# Patient Record
Sex: MALE | Race: BLACK OR AFRICAN AMERICAN
[De-identification: names, ages, dates, MRNs, and addresses within clinical notes are randomized per-mention and may not be internally consistent; named-entity substitution may affect disease eponyms.]

---

## 2018-07-04 ENCOUNTER — HOSPITAL ENCOUNTER (INPATIENT)
Dept: HOSPITAL 74 - YASAS | Age: 58
LOS: 4 days | Discharge: HOME | End: 2018-07-08
Attending: SURGERY | Admitting: SURGERY
Payer: COMMERCIAL

## 2018-07-04 VITALS — BODY MASS INDEX: 19.8 KG/M2

## 2018-07-04 DIAGNOSIS — H54.413A: ICD-10-CM

## 2018-07-04 DIAGNOSIS — Z88.6: ICD-10-CM

## 2018-07-04 DIAGNOSIS — J45.909: ICD-10-CM

## 2018-07-04 DIAGNOSIS — M54.5: ICD-10-CM

## 2018-07-04 DIAGNOSIS — F10.230: Primary | ICD-10-CM

## 2018-07-04 DIAGNOSIS — R63.4: ICD-10-CM

## 2018-07-04 DIAGNOSIS — F14.20: ICD-10-CM

## 2018-07-04 DIAGNOSIS — Z59.0: ICD-10-CM

## 2018-07-04 DIAGNOSIS — Z91.018: ICD-10-CM

## 2018-07-04 DIAGNOSIS — G89.29: ICD-10-CM

## 2018-07-04 DIAGNOSIS — F17.210: ICD-10-CM

## 2018-07-04 DIAGNOSIS — F50.9: ICD-10-CM

## 2018-07-04 DIAGNOSIS — R76.11: ICD-10-CM

## 2018-07-04 PROCEDURE — HZ2ZZZZ DETOXIFICATION SERVICES FOR SUBSTANCE ABUSE TREATMENT: ICD-10-PCS | Performed by: SURGERY

## 2018-07-04 SDOH — ECONOMIC STABILITY - HOUSING INSECURITY: HOMELESSNESS: Z59.0

## 2018-07-04 NOTE — HP
CIWA Score





- CIWA Score


Nausea/Vomiting: 3


Muscle Tremors: 3


Anxiety: 3


Agitation: 3


Paroxysmal Sweats: 1-Minimal Palms Moist


Orientation: 0-Oriented


Tacttile Disturbances: 1-Very Mild Itch/Numbness


Auditory Disturbances: 1-Very Mild


Visual Disturbances: 0-None


Headache: 2-Mild


CIWA-Ar Total Score: 17





Admission ROS [facility]





- HPI


Chief Complaint: 





i need help to stop drinking alcohol and cocaine


Allergies/Adverse Reactions: 


 Allergies











Allergy/AdvReac Type Severity Reaction Status Date / Time


 


aspirin Allergy Severe Swelling Verified 16 18:26


 


tomato [Tomato] Allergy Mild  Verified 16 18:26


 


milk AdvReac Intermediate Nausea Verified 16 18:26


 


lactose AdvReac  Nausea Verified 16 18:26











History of Present Illness: 





this 58 years old male with alcohol and cocaine dependence,seeking detox,

withdrawal symptom,last detox





- Ebola screening


Have you traveled outside of the country in the last 21 days: No (N)


Have you had contact with anyone from an Ebola affected area: No


Have you been sick,other than usual withdrawal symptoms: No


Do you have a fever: No





- Review of Systems


Constitutional: Loss of Appetite, Malaise, Night Sweats, Changes in sleep, 

Unintentional Wgt. Loss


EENT: reports: Nose Congestion, Other (injury to right eye 2017,hit by object,

had surgery ,but loss vison since then treated at [facility])


Respiratory: reports: No Symptoms reported


Cardiac: reports: No Symptoms Reported


GI: reports: Diarrhea, Poor Appetite, Vomiting


: reports: No Symptoms Reported


Musculoskeletal: reports: Muscle Pain


Integumentary: reports: Dryness


Neuro: reports: Tremors


Hematology: reports: No Symptoms Reported


Psychiatric: reports: No Sypmtoms Reported, Judgement Intact, Mood/Affect 

Appropiate, Orientated x3





Patient History





- Patient Medical History


Hx Anemia: Yes (not sure if still anemic; not taking any supplement)


Hx Asthma: Yes


Hx Chronic Obstructive Pulmonary Disease (COPD): No


Hx Cancer: No


Hx Cardiac Disorders: No


Hx Congestive Heart Failure: No


Hx Hypertension: No


Hx Hypercholesterolemia: No


Hx Pacemaker: No


HX Cerebrovascular Accident: No


Hx Seizures: No


Hx Dementia: No


Hx Diabetes: No


Hx Gastrointestinal Disorders: No


Hx Liver Disease: No


Hx Genitourinary Disorders: No


Hx Sexually Transmitted Disorders: No


Hx Renal Disease (ESRD): No


Hx Thyroid Disease: No


Hx Human Immunodeficiency Virus (HIV): No ( negative)


Hx Hepatitis C: No


Hx Depression: No


Hx Suicide Attempt: No


Hx Bipolar Disorder: No


Hx Schizophrenia: No


Other Medical History: no suicidal,no homicidal





- Patient Surgical History


Past Surgical History: Yes


Hx Neurologic Surgery: No


Hx Cataract Extraction: No


Hx Cardiac Surgery: No


Hx Lung Surgery: No


Hx Breast Surgery: No


Hx Breast Biopsy: No


Hx Abdominal Surgery: No


Hx Appendectomy: Yes (at age of 9 years)


Hx Cholecystectomy: No


Hx Genitourinary Surgery: No


Hx  Section: No


Hx Orthopedic Surgery: No


Other Surgical History: Testicle removed @ 6 yrs old.


Anesthesia Reaction: No





- PPD History


Previous Implant?: Yes


Documented Results: Positive w/o proof


Date: 13


Results: POSITIVE


PPD to be Administered?: No





- Smoking Cessation


Smoking history: Current every day smoker


Have you smoked in the past 12 months: Yes


Aproximately how many cigarettes per day: 10


Cigars Per Day: 0


Hx Chewing Tobacco Use: No


Initiated information on smoking cessation: Yes


'Breaking Loose' booklet given: 18





- Substance & Tx. History


Hx Alcohol Use: Yes


Hx Substance Use: Yes


Substance Use Type: Alcohol, Cocaine


Hx Substance Use Treatment: Yes ([facility] 16 to 16)





- Substances Abused


  ** Alcohol


Route: Oral


Frequency: Daily


Amount used: 1/5th vodka/12 of 24 ozs of beer


Age of first use: 8


Date of Last Use: 18





  ** Cocaine


Route: Inhalation


Frequency: 3-6 times per week


Amount used: 200$


Age of first use: 17


Date of Last Use: 18





Family Disease History





- Family Disease History


Family Disease History: CA: Grandparent (MAT GM-BREAST-), Other: Father 

(ALCOHOLIC-), Brother (ALCOHOLIC)





Admission Physical Exam [facility]





- Vital Signs


Vital Signs: 


 Vital Signs - 24 hr











  18





  23:21


 


Temperature 97.7 F


 


Pulse Rate 72


 


Respiratory 19





Rate 


 


Blood Pressure 134/94














- Physical


General Appearance: Yes: Moderate Distress, Irritable, Sweating, Anxious


HEENTM: Yes: Nasal Congestion (blindness of right eye post trauma had surgery)


Respiratory: Yes: Lungs Clear, Normal Breath Sounds, No Respiratory Distress


Neck: Yes: Within Normal Limits


Breast: Yes: Within Normal Limits


Cardiology: Yes: Within Normal Limits, Regular Rhythm, Regular Rate, S1, S2


Abdominal: Yes: Within Normal Limits, Normal Bowel Sounds, Non Tender, Flat, 

Soft


Genitourinary: Yes: Within Normal Limits


Back: Yes: Within Normal Limits, Muscle Spasm


Musculoskeletal: Yes: full range of Motion, Back pain, Muscle Pain


Extremities: Yes: Tremors


Neurological: Yes: CNs II-XII NML intact, Fully Oriented, Alert, Motor Strength 

5/5


Integumentary: Yes: Dry


Lymphatic: Yes: Within Normal Limits





- Diagnostic


(1) Alcohol dependence with uncomplicated withdrawal


Current Visit: No   Status: Acute   





(2) Cocaine dependence


Current Visit: No   Status: Acute   





(3) Asthma


Current Visit: No   Status: Chronic   





(4) Chronic LBP


Current Visit: No   Status: Chronic   





(5) Nicotine dependence


Current Visit: No   Status: Chronic   





(6) History of anemia


Current Visit: No   Status: Suspected   





(7) Blindness of right eye


Current Visit: Yes   Status: Acute   





(8) Weight loss


Current Visit: Yes   Status: Acute   





(9) Positive PPD


Current Visit: Yes   Status: Acute   





Cleared for Admission [facility]





- Detox or Rehab


[facility] Level of Care: Medically Managed


Detox Regimen/Protocol: Librium





BHS Breath Alcohol Content


Breath Alcohol Content: 0.025





Urine Drug Screen





- Results


Drug Screen Negative: No


Urine Drug Screen Results: KIMBERLY-Cocaine, BZO-Benzodiazepines

## 2018-07-05 LAB
ALBUMIN SERPL-MCNC: 4 G/DL (ref 3.4–5)
ALP SERPL-CCNC: 56 U/L (ref 45–117)
ALT SERPL-CCNC: 26 U/L (ref 12–78)
ANION GAP SERPL CALC-SCNC: 8 MMOL/L (ref 8–16)
AST SERPL-CCNC: 26 U/L (ref 15–37)
BILIRUB SERPL-MCNC: 1.2 MG/DL (ref 0.2–1)
BUN SERPL-MCNC: 25 MG/DL (ref 7–18)
CALCIUM SERPL-MCNC: 8.7 MG/DL (ref 8.5–10.1)
CHLORIDE SERPL-SCNC: 100 MMOL/L (ref 98–107)
CO2 SERPL-SCNC: 31 MMOL/L (ref 21–32)
CREAT SERPL-MCNC: 1.5 MG/DL (ref 0.7–1.3)
DEPRECATED RDW RBC AUTO: 23 % (ref 11.9–15.9)
GLUCOSE SERPL-MCNC: 110 MG/DL (ref 74–106)
HCT VFR BLD CALC: 30.2 % (ref 35.4–49)
HGB BLD-MCNC: 10 GM/DL (ref 11.7–16.9)
MCH RBC QN AUTO: 27.6 PG (ref 25.7–33.7)
MCHC RBC AUTO-ENTMCNC: 33 G/DL (ref 32–35.9)
MCV RBC: 83.6 FL (ref 80–96)
PLATELET # BLD AUTO: 220 K/MM3 (ref 134–434)
PMV BLD: 8.9 FL (ref 7.5–11.1)
POTASSIUM SERPLBLD-SCNC: 4.2 MMOL/L (ref 3.5–5.1)
PROT SERPL-MCNC: 7.8 G/DL (ref 6.4–8.2)
RBC # BLD AUTO: 3.61 M/MM3 (ref 4–5.6)
SODIUM SERPL-SCNC: 139 MMOL/L (ref 136–145)
WBC # BLD AUTO: 4.3 K/MM3 (ref 4–10)

## 2018-07-05 RX ADMIN — Medication PRN MG: at 01:37

## 2018-07-05 RX ADMIN — BRIMONIDINE TARTRATE SCH DROP: 2 SOLUTION/ DROPS OPHTHALMIC at 10:54

## 2018-07-05 RX ADMIN — POLYVINYL ALCOHOL SCH: 14 SOLUTION/ DROPS OPHTHALMIC at 07:57

## 2018-07-05 RX ADMIN — Medication SCH TAB: at 10:52

## 2018-07-05 RX ADMIN — Medication PRN MG: at 22:39

## 2018-07-05 RX ADMIN — TIMOLOL MALEATE SCH DRP: 5 SOLUTION OPHTHALMIC at 10:54

## 2018-07-05 RX ADMIN — POLYVINYL ALCOHOL SCH DROP: 14 SOLUTION/ DROPS OPHTHALMIC at 22:40

## 2018-07-05 RX ADMIN — Medication SCH MG: at 22:38

## 2018-07-05 RX ADMIN — POLYVINYL ALCOHOL SCH: 14 SOLUTION/ DROPS OPHTHALMIC at 14:43

## 2018-07-05 RX ADMIN — CYCLOBENZAPRINE HYDROCHLORIDE SCH MG: 10 TABLET, FILM COATED ORAL at 22:38

## 2018-07-05 RX ADMIN — BRIMONIDINE TARTRATE SCH DROP: 2 SOLUTION/ DROPS OPHTHALMIC at 22:41

## 2018-07-05 RX ADMIN — TIMOLOL MALEATE SCH DROP: 5 SOLUTION OPHTHALMIC at 22:41

## 2018-07-05 NOTE — PN
BHS Progress Note


Note: 





pt is complaining of whole body pain- will give flexeril,  pt is on tylenol and 

motrin

## 2018-07-05 NOTE — EKG
Test Reason : 

Blood Pressure : ***/*** mmHG

Vent. Rate : 064 BPM     Atrial Rate : 064 BPM

   P-R Int : 158 ms          QRS Dur : 092 ms

    QT Int : 414 ms       P-R-T Axes : 072 038 049 degrees

   QTc Int : 427 ms

 

NORMAL SINUS RHYTHM

MODERATE VOLTAGE CRITERIA FOR LVH, MAY BE NORMAL VARIANT

BORDERLINE ECG

WHEN COMPARED WITH ECG OF 23-JUL-2015 00:59,

NO SIGNIFICANT CHANGE WAS FOUND

Confirmed by BREN RUDD, TIM ([id number]) on 7/5/2018 11:30:47 AM

 

Referred By:             Confirmed By:TIM CORREIA MD

## 2018-07-05 NOTE — PN
[facility] CIWA





- CIWA Score


Nausea/Vomitin-No Nausea/No Vomiting


Muscle Tremors: 4-Moderate,w/Arms Extend


Anxiety: 4-Mod. Anxious/Guarded


Agitation: 4-Moderately Restless


Paroxysmal Sweats: 1-Minimal Palms Moist


Orientation: 0-Oriented


Tacttile Disturbances: 0-None


Auditory Disturbances: 0-None


Visual Disturbances: 0-None


Headache: 0-None Present


CIWA-Ar Total Score: 13





BHS Progress Note (SOAP)


Subjective: 





ANXIETY,FATIGUE,INTERMITTENT SLEEP.


Objective: 





18 14:36


 Vital Signs











  18





  09:19 13:40


 


Temperature 97.0 F L 97.3 F L


 


Pulse Rate 55 L 61


 


Respiratory 18 20





Rate  


 


Blood Pressure 117/64 110/61








 Laboratory Tests











  18





  07:40 07:40 07:40


 


WBC  4.3  


 


RBC  3.61 L  


 


Hgb  10.0 L  


 


Hct  30.2 L  


 


MCV  83.6  


 


MCH  27.6  


 


MCHC  33.0  


 


RDW  23.0 H  


 


Plt Count  220  


 


MPV  8.9  


 


Sodium   139 


 


Potassium   4.2 


 


Chloride   100 


 


Carbon Dioxide   31 


 


Anion Gap   8 


 


BUN   25 H 


 


Creatinine   1.5 H 


 


Creat Clearance w eGFR   48.07 


 


Random Glucose   110 H D 


 


Calcium   8.7 


 


Total Bilirubin   1.2 H 


 


AST   26  D 


 


ALT   26 


 


Alkaline Phosphatase   56 


 


Total Protein   7.8 


 


Albumin   4.0 


 


RPR Titer    Nonreactive











Assessment: 





18 14:37


WITHDRAWAL SX


Plan: 





CONTINUE DETOX

## 2018-07-06 RX ADMIN — Medication SCH MG: at 22:10

## 2018-07-06 RX ADMIN — CYCLOBENZAPRINE HYDROCHLORIDE SCH: 10 TABLET, FILM COATED ORAL at 06:16

## 2018-07-06 RX ADMIN — FERROUS SULFATE TAB EC 324 MG (65 MG FE EQUIVALENT) SCH MG: 324 (65 FE) TABLET DELAYED RESPONSE at 14:20

## 2018-07-06 RX ADMIN — TIMOLOL MALEATE SCH DROP: 5 SOLUTION OPHTHALMIC at 10:20

## 2018-07-06 RX ADMIN — BRIMONIDINE TARTRATE SCH DROP: 2 SOLUTION/ DROPS OPHTHALMIC at 22:11

## 2018-07-06 RX ADMIN — POLYVINYL ALCOHOL SCH DROP: 14 SOLUTION/ DROPS OPHTHALMIC at 22:18

## 2018-07-06 RX ADMIN — POLYVINYL ALCOHOL SCH: 14 SOLUTION/ DROPS OPHTHALMIC at 06:16

## 2018-07-06 RX ADMIN — POLYVINYL ALCOHOL SCH DROP: 14 SOLUTION/ DROPS OPHTHALMIC at 14:18

## 2018-07-06 RX ADMIN — CYCLOBENZAPRINE HYDROCHLORIDE SCH MG: 10 TABLET, FILM COATED ORAL at 22:10

## 2018-07-06 RX ADMIN — TIMOLOL MALEATE SCH DROP: 5 SOLUTION OPHTHALMIC at 22:12

## 2018-07-06 RX ADMIN — BRIMONIDINE TARTRATE SCH DROP: 2 SOLUTION/ DROPS OPHTHALMIC at 10:21

## 2018-07-06 RX ADMIN — Medication PRN MG: at 22:14

## 2018-07-06 RX ADMIN — CYCLOBENZAPRINE HYDROCHLORIDE SCH MG: 10 TABLET, FILM COATED ORAL at 14:18

## 2018-07-06 RX ADMIN — Medication SCH TAB: at 10:21

## 2018-07-06 NOTE — PN
S CIWA





- CIWA Score


Nausea/Vomitin-No Nausea/No Vomiting


Muscle Tremors: 4-Moderate,w/Arms Extend


Anxiety: 4-Mod. Anxious/Guarded


Agitation: 4-Moderately Restless


Paroxysmal Sweats: 1-Minimal Palms Moist


Orientation: 0-Oriented


Tacttile Disturbances: 0-None


Auditory Disturbances: 0-None


Visual Disturbances: 0-None


Headache: 0-None Present


CIWA-Ar Total Score: 13





BHS Progress Note (SOAP)


Subjective: 





ANXIETY,INTERMITTENT SLEEP,FATIGUE.


Objective: 





18 12:36


 Vital Signs











  18





  06:43


 


Temperature 97.2 F L


 


Pulse Rate 51 L


 


Respiratory 18





Rate 


 


Blood Pressure 108/65








 Laboratory Tests











  18





  07:40 07:40 07:40


 


WBC  4.3  


 


RBC  3.61 L  


 


Hgb  10.0 L  


 


Hct  30.2 L  


 


MCV  83.6  


 


MCH  27.6  


 


MCHC  33.0  


 


RDW  23.0 H  


 


Plt Count  220  


 


MPV  8.9  


 


Sodium   139 


 


Potassium   4.2 


 


Chloride   100 


 


Carbon Dioxide   31 


 


Anion Gap   8 


 


BUN   25 H 


 


Creatinine   1.5 H 


 


Creat Clearance w eGFR   48.07 


 


Random Glucose   110 H D 


 


Calcium   8.7 


 


Total Bilirubin   1.2 H 


 


AST   26  D 


 


ALT   26 


 


Alkaline Phosphatase   56 


 


Total Protein   7.8 


 


Albumin   4.0 


 


RPR Titer    Nonreactive











Assessment: 





18 12:36


WITHDRAWAL SX


Plan: 





CONTINUE DETOX

## 2018-07-07 RX ADMIN — CYCLOBENZAPRINE HYDROCHLORIDE SCH MG: 10 TABLET, FILM COATED ORAL at 22:27

## 2018-07-07 RX ADMIN — POLYVINYL ALCOHOL SCH: 14 SOLUTION/ DROPS OPHTHALMIC at 22:31

## 2018-07-07 RX ADMIN — BRIMONIDINE TARTRATE SCH DROP: 2 SOLUTION/ DROPS OPHTHALMIC at 10:26

## 2018-07-07 RX ADMIN — Medication SCH TAB: at 10:26

## 2018-07-07 RX ADMIN — BRIMONIDINE TARTRATE SCH: 2 SOLUTION/ DROPS OPHTHALMIC at 22:30

## 2018-07-07 RX ADMIN — Medication PRN MG: at 22:28

## 2018-07-07 RX ADMIN — POLYVINYL ALCOHOL SCH: 14 SOLUTION/ DROPS OPHTHALMIC at 06:47

## 2018-07-07 RX ADMIN — CYCLOBENZAPRINE HYDROCHLORIDE SCH: 10 TABLET, FILM COATED ORAL at 06:45

## 2018-07-07 RX ADMIN — TIMOLOL MALEATE SCH DROP: 5 SOLUTION OPHTHALMIC at 10:26

## 2018-07-07 RX ADMIN — CYCLOBENZAPRINE HYDROCHLORIDE SCH MG: 10 TABLET, FILM COATED ORAL at 14:34

## 2018-07-07 RX ADMIN — Medication SCH MG: at 22:27

## 2018-07-07 RX ADMIN — FERROUS SULFATE TAB EC 324 MG (65 MG FE EQUIVALENT) SCH MG: 324 (65 FE) TABLET DELAYED RESPONSE at 10:26

## 2018-07-07 RX ADMIN — POLYVINYL ALCOHOL SCH DROP: 14 SOLUTION/ DROPS OPHTHALMIC at 14:34

## 2018-07-07 RX ADMIN — TIMOLOL MALEATE SCH: 5 SOLUTION OPHTHALMIC at 22:30

## 2018-07-07 NOTE — PN
BHS Progress Note (SOAP)


Subjective: 





Anxious, Fatigue.


Objective: 


PATIENT A & O X 3, OBSERVED AMBULATING ON UNIT. NO ACUTE DISTRESS.





07/07/18 16:40


 Vital Signs











Temperature  97.4 F L  07/07/18 15:16


 


Pulse Rate  66   07/07/18 15:16


 


Respiratory Rate  18   07/07/18 15:16


 


Blood Pressure  119/70   07/07/18 15:16


 


O2 Sat by Pulse Oximetry (%)      








 Laboratory Tests











  07/05/18 07/05/18 07/05/18





  07:40 07:40 07:40


 


WBC  4.3  


 


RBC  3.61 L  


 


Hgb  10.0 L  


 


Hct  30.2 L  


 


MCV  83.6  


 


MCH  27.6  


 


MCHC  33.0  


 


RDW  23.0 H  


 


Plt Count  220  


 


MPV  8.9  


 


Sodium   139 


 


Potassium   4.2 


 


Chloride   100 


 


Carbon Dioxide   31 


 


Anion Gap   8 


 


BUN   25 H 


 


Creatinine   1.5 H 


 


Creat Clearance w eGFR   48.07 


 


Random Glucose   110 H D 


 


Calcium   8.7 


 


Total Bilirubin   1.2 H 


 


AST   26  D 


 


ALT   26 


 


Alkaline Phosphatase   56 


 


Total Protein   7.8 


 


Albumin   4.0 


 


RPR Titer    Nonreactive








LABS NOTED.


UA RESULTS PENDING.


07/07/18 16:41





Assessment: 





07/07/18 16:41


WITHDRAWAL SYMPTOMS.


Plan: 





CONTINUE DETOX.





PATIENT SCHEDULED FOR DISCHARGE TOMORROW.

## 2018-07-08 VITALS — DIASTOLIC BLOOD PRESSURE: 68 MMHG | TEMPERATURE: 96.8 F | SYSTOLIC BLOOD PRESSURE: 119 MMHG | HEART RATE: 50 BPM

## 2018-07-08 RX ADMIN — BRIMONIDINE TARTRATE SCH DROP: 2 SOLUTION/ DROPS OPHTHALMIC at 09:04

## 2018-07-08 RX ADMIN — Medication SCH TAB: at 09:03

## 2018-07-08 RX ADMIN — FERROUS SULFATE TAB EC 324 MG (65 MG FE EQUIVALENT) SCH MG: 324 (65 FE) TABLET DELAYED RESPONSE at 09:03

## 2018-07-08 RX ADMIN — POLYVINYL ALCOHOL SCH: 14 SOLUTION/ DROPS OPHTHALMIC at 06:09

## 2018-07-08 RX ADMIN — CYCLOBENZAPRINE HYDROCHLORIDE SCH: 10 TABLET, FILM COATED ORAL at 06:09

## 2018-07-08 RX ADMIN — TIMOLOL MALEATE SCH DROP: 5 SOLUTION OPHTHALMIC at 09:04

## 2018-07-08 NOTE — PN
BHS Progress Note (SOAP)


Subjective: 





pt without complaints today- gong home


Objective: 





07/08/18 11:36


 Vital Signs - 24 hr











  07/07/18 07/07/18 07/07/18





  15:16 18:31 23:53


 


Temperature 97.4 F L 97.4 F L 97 F L


 


Pulse Rate 66 57 L 69


 


Respiratory 18 18 18





Rate   


 


Blood Pressure 119/70 97/56 116/77














  07/08/18 07/08/18 07/08/18





  00:30 03:30 06:24


 


Temperature   96.8 F L


 


Pulse Rate   50 L


 


Respiratory 18 18 18





Rate   


 


Blood Pressure   119/68








 CBC, BMP





 07/05/18 07:40 





 07/05/18 07:40 





stable VSS


ambulatory


Assessment: 





07/08/18 11:37


alcohol detox completed


anemia- f/u PCP


Plan: 





f/u PCP 


d/c to home today

## 2018-07-08 NOTE — DS
BHS Detox Discharge Summary


Admission Date: 


07/04/18





Discharge Date: 07/08/18





- History


Present History: Alcohol Dependence, Cocaine Dependence





- Physical Exam Results


Vital Signs: 


 Vital Signs











Temperature  96.8 F L  07/08/18 06:24


 


Pulse Rate  50 L  07/08/18 06:24


 


Respiratory Rate  18   07/08/18 06:24


 


Blood Pressure  119/68   07/08/18 06:24


 


O2 Sat by Pulse Oximetry (%)      











Pertinent Admission Physical Exam Findings: 





pt awake and oriented


grossly nl PE





- Treatment


Hospital Course: Detox Protocol Followed, Detoxed Safely, Responded well, 

Discharged Condition Good





- Medication


Discharge Medications: 


Ambulatory Orders





Albuterol Sulfate Inhaler - [Ventolin HFA Inhaler -] 2 inh PO Q4H PRN 02/09/16 


Alphagan 0.2% - 1 drop OS BID 07/05/18 


Polyvinyl Alcohol [Artificial Tears] 1 drop OU TID 07/05/18 


Timolol 0.5% [Timoptic 0.5%] 1 drop OS BID 07/05/18 











- Diagnosis


(1) Alcohol dependence with uncomplicated withdrawal


Status: Acute   





(2) Cocaine dependence


Status: Acute   





(3) Blindness of right eye


Status: Chronic   





(4) Iron deficiency anemia


Status: Suspected   


Qualifiers: 


   Iron deficiency anemia type: unspecified iron deficiency   Qualified Code(s)

: D50.9 - Iron deficiency anemia, unspecified   





- AMA


Did Patient Leave Against Medical Advice: No

## 2018-09-29 ENCOUNTER — HOSPITAL ENCOUNTER (INPATIENT)
Dept: HOSPITAL 74 - YASAS | Age: 58
LOS: 2 days | Discharge: LEFT BEFORE DISCHARGE | DRG: 770 | End: 2018-10-01
Attending: INTERNAL MEDICINE | Admitting: INTERNAL MEDICINE
Payer: COMMERCIAL

## 2018-09-29 VITALS — BODY MASS INDEX: 20.9 KG/M2

## 2018-09-29 DIAGNOSIS — J45.909: ICD-10-CM

## 2018-09-29 DIAGNOSIS — Z87.438: ICD-10-CM

## 2018-09-29 DIAGNOSIS — K21.9: ICD-10-CM

## 2018-09-29 DIAGNOSIS — F17.210: ICD-10-CM

## 2018-09-29 DIAGNOSIS — R76.11: ICD-10-CM

## 2018-09-29 DIAGNOSIS — Z88.6: ICD-10-CM

## 2018-09-29 DIAGNOSIS — M54.5: ICD-10-CM

## 2018-09-29 DIAGNOSIS — I12.9: ICD-10-CM

## 2018-09-29 DIAGNOSIS — F14.20: ICD-10-CM

## 2018-09-29 DIAGNOSIS — Z59.0: ICD-10-CM

## 2018-09-29 DIAGNOSIS — Z91.011: ICD-10-CM

## 2018-09-29 DIAGNOSIS — F10.230: Primary | ICD-10-CM

## 2018-09-29 DIAGNOSIS — D50.9: ICD-10-CM

## 2018-09-29 DIAGNOSIS — G89.29: ICD-10-CM

## 2018-09-29 DIAGNOSIS — N18.3: ICD-10-CM

## 2018-09-29 DIAGNOSIS — H54.40: ICD-10-CM

## 2018-09-29 PROCEDURE — HZ2ZZZZ DETOXIFICATION SERVICES FOR SUBSTANCE ABUSE TREATMENT: ICD-10-PCS | Performed by: INTERNAL MEDICINE

## 2018-09-29 RX ADMIN — Medication SCH MG: at 23:02

## 2018-09-29 RX ADMIN — AMLODIPINE BESYLATE SCH MG: 5 TABLET ORAL at 10:50

## 2018-09-29 RX ADMIN — Medication SCH: at 12:07

## 2018-09-29 RX ADMIN — Medication SCH: at 23:07

## 2018-09-29 RX ADMIN — Medication SCH: at 14:41

## 2018-09-29 RX ADMIN — TIMOLOL MALEATE SCH: 5 SOLUTION OPHTHALMIC at 12:07

## 2018-09-29 RX ADMIN — TIMOLOL MALEATE SCH DROP: 5 SOLUTION OPHTHALMIC at 22:39

## 2018-09-29 RX ADMIN — Medication PRN MG: at 22:42

## 2018-09-29 RX ADMIN — Medication SCH: at 17:22

## 2018-09-29 SDOH — ECONOMIC STABILITY - HOUSING INSECURITY: HOMELESSNESS: Z59.0

## 2018-09-29 NOTE — HP
CIWA Score





- CIWA Score


Nausea/Vomitin-Mild Nausea/No Vomiting


Muscle Tremors: 2


Anxiety: 3


Agitation: 1-Slight > Activity


Paroxysmal Sweats: No Perspiration


Orientation: 1-Uncertain about Date


Tacttile Disturbances: 1-Very Mild Itch/Numbness


Auditory Disturbances: 0-None


Visual Disturbances: 1-Very Mild Sensitivity


Headache: 2-Mild


CIWA-Ar Total Score: 12





Admission ROS [facility]





- HPI


Chief Complaint: 


I'm too old, I can't do it anymore, I'm an alcoholic, I need help


Allergies/Adverse Reactions: 


 Allergies











Allergy/AdvReac Type Severity Reaction Status Date / Time


 


aspirin Allergy Severe Swelling Verified 18 09:20


 


tomato [Tomato] Allergy Mild  Verified 18 09:20


 


milk AdvReac Intermediate Nausea Verified 18 09:20


 


lactose AdvReac  Nausea Verified 18 09:20











History of Present Illness: 


57 yo gentleman here for detox from alcohol, also using cocaine.  Denies 

seizures but does have black outs.  Patient reports he gets very sick when he 

tries to stop drinking - feels like 'something sticks into him all over' , 

states he gets very shaky and gets diarrhea and gets very nervous when he tries 

to stop drinking on his own.


Exam Limitations: Clinical Condition





- Ebola screening


Have you traveled outside of the country in the last 21 days: No (N)


Have you had contact with anyone from an Ebola affected area: No


Have you been sick,other than usual withdrawal symptoms: No


Do you have a fever: No





- Review of Systems


Constitutional: Loss of Appetite, Malaise, Changes in sleep, Weakness


EENT: reports: Blurred Vision


Respiratory: reports: No Symptoms reported


Cardiac: reports: No Symptoms Reported


GI: reports: Diarrhea, Nausea, Poor Appetite, Indigestion, Abdominal cramping


: reports: Frequency


Musculoskeletal: reports: Back Pain, Muscle Pain


Integumentary: reports: Dryness


Neuro: reports: Headache


Endocrine: reports: No Symptoms Reported


Hematology: reports: Anemia


Psychiatric: reports: Judgement Intact, Mood/Affect Appropiate, Anxious


Other Systems: Reviewed and Negative





Patient History





- Patient Medical History


Hx Anemia: Yes (not sure if still anemic; not taking any supplement)


Hx Asthma: Yes


Hx Chronic Obstructive Pulmonary Disease (COPD): No


Hx Cancer: No


Hx Cardiac Disorders: No


Hx Congestive Heart Failure: No


Hx Hypertension: Yes (on meds)


Hx Hypercholesterolemia: No


Hx Pacemaker: No


HX Cerebrovascular Accident: No


Hx Seizures: No


Hx Dementia: No


Hx Diabetes: No


Hx Gastrointestinal Disorders: Yes (gerd)


Hx Liver Disease: No


Hx Genitourinary Disorders: No


Hx Sexually Transmitted Disorders: Yes (hx syphilis years ago got injections)


Hx Renal Disease (ESRD): Yes (renal insufficiency)


Hx Thyroid Disease: No


Hx Human Immunodeficiency Virus (HIV): No ( negative)


Hx Hepatitis C: No


Hx Depression: No (denied)


Hx Suicide Attempt: No


Hx Bipolar Disorder: No


Hx Schizophrenia: No





- Patient Surgical History


Past Surgical History: Yes


Hx Neurologic Surgery: No


Hx Cataract Extraction: No


Hx Cardiac Surgery: No


Hx Lung Surgery: No


Hx Breast Surgery: No


Hx Breast Biopsy: No


Hx Abdominal Surgery: No


Hx Appendectomy: Yes (at age of 9 years)


Hx Cholecystectomy: No


Hx Genitourinary Surgery: No


Hx  Section: No


Hx Orthopedic Surgery: No


Other Surgical History: Testicle removed @ 6 yrs old.;right eye surgery 2017 to 

remove lens-trauma


Anesthesia Reaction: No





- PPD History


Previous Implant?: Yes


Documented Results: Positive w/proof


Implanted On Prior [facility] Admission?: No


Date: 13 (treated)


Results: cxr 18 done


PPD to be Administered?: No





- Reproductive History


Patient is a Female of Child Bearing Age (11 -55 yrs old): No





- Smoking Cessation


Smoking history: Current every day smoker


Have you smoked in the past 12 months: Yes


Aproximately how many cigarettes per day: 10


Cigars Per Day: 0


Hx Chewing Tobacco Use: No


Initiated information on smoking cessation: Yes


'Breaking Loose' booklet given: 18 (give on floor)





- Substance & Tx. History


Hx Alcohol Use: Yes


Hx Substance Use: Yes


Substance Use Type: Alcohol, Cocaine


Hx Substance Use Treatment: Yes





- Substances Abused


  ** alcohol


Route: Oral


Frequency: Daily


Amount used: 1/5 vodka; 24 oz beer


Age of first use: 8


Date of Last Use: 18





  ** cocaine


Route: Inhalation


Frequency: Daily


Amount used: $200


Age of first use: 17


Date of Last Use: 18





Family Disease History





- Family Disease History


Family Disease History: CA: Grandparent (MAT GM-BREAST-), Other: Father 

( - etoh), Mother (living, ), Brother (two - one who drinks), Sister (

one - living - healthy), Son (two - living)





Admission Physical Exam [facility]





- Vital Signs


Vital Signs: 


 Vital Signs - 24 hr











  18





  08:41


 


Temperature 97.6 F


 


Pulse Rate 77


 


Respiratory 19





Rate 


 


Blood Pressure 121/71














- Physical


General Appearance: Yes: Nourished, Appropriately Dressed, Mild Distress, 

Anxious


HEENTM: Yes: EOMI, Hearing grossly Normal, Normocephalic, Normal Voice, Pharynx 

Normal, Other (blind right eye)


Respiratory: Yes: Normal Breath Sounds, No Respiratory Distress


Neck: Yes: No masses,lesions,Nodules


Breast: Yes: Breast Exam Deferred


Cardiology: Yes: Regular Rhythm, Regular Rate


Abdominal: Yes: Non Tender, Flat


Genitourinary: Yes: Frequency


Back: Yes: Normal Inspection, Other (mild kyphosis)


Musculoskeletal: Yes: full range of Motion, Gait Steady


Extremities: Yes: Normal Inspection, Normal Range of Motion, Non-Tender, Pedal 

Edema (mild ankle edema)


Neurological: Yes: Alert, Motor Strength 5/5, Normal Mood/Affect, Normal 

Response


Integumentary: Yes: Normal Color, Dry, Warm, Other (left shin healed scrape)


Lymphatic: Yes: Within Normal Limits





- Diagnostic


(1) Alcohol dependence with uncomplicated withdrawal


Current Visit: Yes   Status: Acute   





(2) Cocaine dependence, uncomplicated


Current Visit: Yes   Status: Acute   





(3) Asthma


Current Visit: Yes   Status: Chronic   





(4) Blindness of right eye


Current Visit: Yes   Status: Chronic   





(5) Chronic LBP


Current Visit: Yes   Status: Chronic   


Qualifiers: 


   Back pain laterality: unspecified   Sciatica presence: unspecified whether 

sciatica present   Qualified Code(s): M54.5 - Low back pain; G89.29 - Other 

chronic pain   





(6) PPD positive, treated


Current Visit: Yes   Status: Chronic   Comment: CXR done 18    





(7) Chronic renal insufficiency, stage III (moderate)


Current Visit: Yes   Status: Chronic   





(8) History of anemia


Current Visit: Yes   Status: Chronic   





(9) Blackout


Current Visit: Yes   Status: Suspected   





Cleared for Admission [facility]





- Detox or Rehab


[facility] Level of Care: Medically Managed


Detox Regimen/Protocol: Librium





BHS Breath Alcohol Content


Breath Alcohol Content: 0.219





Urine Drug Screen





- Results


Drug Screen Negative: No


Urine Drug Screen Results: KIMBERLY-Cocaine, BZO-Benzodiazepines

## 2018-09-30 LAB
ALBUMIN SERPL-MCNC: 3.4 G/DL (ref 3.4–5)
ALP SERPL-CCNC: 66 U/L (ref 45–117)
ALT SERPL-CCNC: 20 U/L (ref 13–61)
ANION GAP SERPL CALC-SCNC: 6 MMOL/L (ref 8–16)
AST SERPL-CCNC: 28 U/L (ref 15–37)
BILIRUB SERPL-MCNC: 0.7 MG/DL (ref 0.2–1)
BUN SERPL-MCNC: 17 MG/DL (ref 7–18)
CALCIUM SERPL-MCNC: 8.8 MG/DL (ref 8.5–10.1)
CHLORIDE SERPL-SCNC: 103 MMOL/L (ref 98–107)
CO2 SERPL-SCNC: 32 MMOL/L (ref 21–32)
CREAT SERPL-MCNC: 1.1 MG/DL (ref 0.55–1.3)
DEPRECATED RDW RBC AUTO: 18.1 % (ref 11.9–15.9)
GLUCOSE SERPL-MCNC: 68 MG/DL (ref 74–106)
HCT VFR BLD CALC: 33.2 % (ref 35.4–49)
HGB BLD-MCNC: 10.5 GM/DL (ref 11.7–16.9)
MCH RBC QN AUTO: 27 PG (ref 25.7–33.7)
MCHC RBC AUTO-ENTMCNC: 31.7 G/DL (ref 32–35.9)
MCV RBC: 85.1 FL (ref 80–96)
PLATELET # BLD AUTO: 262 K/MM3 (ref 134–434)
PMV BLD: 9.1 FL (ref 7.5–11.1)
POTASSIUM SERPLBLD-SCNC: 4.4 MMOL/L (ref 3.5–5.1)
PROT SERPL-MCNC: 7.6 G/DL (ref 6.4–8.2)
RBC # BLD AUTO: 3.9 M/MM3 (ref 4–5.6)
SODIUM SERPL-SCNC: 140 MMOL/L (ref 136–145)
WBC # BLD AUTO: 4.4 K/MM3 (ref 4–10)

## 2018-09-30 RX ADMIN — Medication SCH TAB: at 10:36

## 2018-09-30 RX ADMIN — Medication SCH ML: at 21:38

## 2018-09-30 RX ADMIN — TIMOLOL MALEATE SCH DROP: 5 SOLUTION OPHTHALMIC at 10:38

## 2018-09-30 RX ADMIN — AMLODIPINE BESYLATE SCH MG: 5 TABLET ORAL at 10:36

## 2018-09-30 RX ADMIN — Medication SCH: at 17:19

## 2018-09-30 RX ADMIN — Medication PRN MG: at 21:36

## 2018-09-30 RX ADMIN — TIMOLOL MALEATE SCH DROP: 5 SOLUTION OPHTHALMIC at 21:38

## 2018-09-30 RX ADMIN — Medication SCH: at 10:37

## 2018-09-30 RX ADMIN — Medication SCH ML: at 15:58

## 2018-09-30 RX ADMIN — Medication SCH MG: at 21:36

## 2018-09-30 NOTE — PN
S CIWA





- CIWA Score


Nausea/Vomitin


Muscle Tremors: 4-Moderate,w/Arms Extend


Anxiety: 4-Mod. Anxious/Guarded


Agitation: 4-Moderately Restless


Paroxysmal Sweats: 3


Orientation: 0-Oriented


Tacttile Disturbances: 0-None


Auditory Disturbances: 0-None


Visual Disturbances: 0-None


Headache: 1-Very Mild


CIWA-Ar Total Score: 18





BHS Progress Note (SOAP)


Subjective: 





Anxious, tremor, chills, interrupted sleep


Objective: 





18 16:55


 Last Vital Signs











Temp Pulse Resp BP Pulse Ox


 


 97.2 F L  62   16   107/63    


 


 18 14:52  18 14:52  18 14:52  18 14:52   








 Laboratory Tests











  18





  07:49 07:49 07:49


 


WBC  4.4  


 


RBC  3.90 L  


 


Hgb  10.5 L  


 


Hct  33.2 L  


 


MCV  85.1  


 


MCH  27.0  


 


MCHC  31.7 L  


 


RDW  18.1 H  


 


Plt Count  262  


 


MPV  9.1  


 


Sodium   140 


 


Potassium   4.4 


 


Chloride   103 


 


Carbon Dioxide   32 


 


Anion Gap   6 L 


 


BUN   17 


 


Creatinine   1.1 


 


Creat Clearance w eGFR   > 60 


 


Random Glucose   68 L 


 


Calcium   8.8 


 


Total Bilirubin   0.7 


 


AST   28 


 


ALT   20 


 


Alkaline Phosphatase   66 


 


Total Protein   7.6 


 


Albumin   3.4 


 


RPR Titer    Nonreactive








Labs reviewed


Assessment: 





18 16:55


Withdrawal sx


Plan: 





Continue detox





Ferrous sulfate 325mg PO BID for iron deficiency anemia, colace 100mg PO qhs to 

prevent constipation

## 2018-10-01 VITALS — SYSTOLIC BLOOD PRESSURE: 122 MMHG | TEMPERATURE: 98 F | DIASTOLIC BLOOD PRESSURE: 71 MMHG

## 2018-10-01 VITALS — HEART RATE: 50 BPM

## 2018-10-01 NOTE — DS
[facility] Detox Discharge Summary


Admission Date: 


09/29/18





Discharge Date: 10/01/18





- History


Present History: Alcohol Dependence, Cocaine Dependence


Pertinent Past History: 





Asthma





- Physical Exam Results


Vital Signs: 


 Vital Signs











Temperature  98 F   10/01/18 06:18


 


Pulse Rate  50 L  10/01/18 06:18


 


Respiratory Rate  16   10/01/18 06:18


 


Blood Pressure  122/71   10/01/18 06:18


 


O2 Sat by Pulse Oximetry (%)      











Pertinent Admission Physical Exam Findings: 





Withdrawal sxs





 Laboratory Tests











  09/30/18 09/30/18 09/30/18





  07:49 07:49 07:49


 


WBC  4.4  


 


RBC  3.90 L  


 


Hgb  10.5 L  


 


Hct  33.2 L  


 


MCV  85.1  


 


MCH  27.0  


 


MCHC  31.7 L  


 


RDW  18.1 H  


 


Plt Count  262  


 


MPV  9.1  


 


Sodium   140 


 


Potassium   4.4 


 


Chloride   103 


 


Carbon Dioxide   32 


 


Anion Gap   6 L 


 


BUN   17 


 


Creatinine   1.1 


 


Creat Clearance w eGFR   > 60 


 


Random Glucose   68 L 


 


Calcium   8.8 


 


Total Bilirubin   0.7 


 


AST   28 


 


ALT   20 


 


Alkaline Phosphatase   66 


 


Total Protein   7.6 


 


Albumin   3.4 


 


RPR Titer    Nonreactive








Labs reviewed 





- Medication


Discharge Medications: 


Ambulatory Orders





Albuterol Sulfate Inhaler - [Ventolin HFA Inhaler -] 2 inh PO Q4H PRN 02/09/16 


Polyvinyl Alcohol [Artificial Tears] 1 drop OU TID 07/05/18 


Timolol 0.5% [Timoptic 0.5%] 1 drop OS BID 07/05/18 


Amlodipine Besylate [Norvasc -] 5 mg PO DAILY 09/29/18 


Brimonidine Tartrate/Timolol [Combigan Eye Drops] 5 ml OP QID 09/29/18 











- Diagnosis


(1) Alcohol dependence with uncomplicated withdrawal


Status: Acute   





(2) Cocaine dependence, uncomplicated


Status: Chronic   





(3) Asthma


Status: Chronic   





(4) Blindness of right eye


Status: Chronic   





(5) Chronic LBP


Status: Chronic   


Qualifiers: 


   Back pain laterality: unspecified   Sciatica presence: unspecified whether 

sciatica present   Qualified Code(s): M54.5 - Low back pain; G89.29 - Other 

chronic pain   





(6) Chronic renal insufficiency, stage III (moderate)


Status: Chronic   





(7) Positive PPD


Status: Chronic   





(8) Iron deficiency anemia


Status: Suspected   


Qualifiers: 


   Iron deficiency anemia type: unspecified iron deficiency   Qualified Code(s)

: D50.9 - Iron deficiency anemia, unspecified   





- AMA


Did Patient Leave Against Medical Advice: Yes (F/U with your PCP within 3 days; 

proceed to ER stat if withdrawal sxs)

## 2018-10-02 NOTE — EKG
Test Reason : 

Blood Pressure : ***/*** mmHG

Vent. Rate : 065 BPM     Atrial Rate : 065 BPM

   P-R Int : 164 ms          QRS Dur : 096 ms

    QT Int : 416 ms       P-R-T Axes : [phone] degrees

   QTc Int : 432 ms

 

NORMAL SINUS RHYTHM

MODERATE VOLTAGE CRITERIA FOR LVH, MAY BE NORMAL VARIANT

BORDERLINE ECG

WHEN COMPARED WITH ECG OF 05-JUL-2018 00:56,

NO SIGNIFICANT CHANGE WAS FOUND

Confirmed by John Munguia MD ([id number]) on 10/2/2018 11:09:38 AM

 

Referred By:             Confirmed By:John Munguia MD

## 2021-04-11 ENCOUNTER — HOSPITAL ENCOUNTER (INPATIENT)
Dept: HOSPITAL 74 - YASAS | Age: 61
LOS: 5 days | Discharge: OTHER INSTITUTION | End: 2021-04-16
Attending: ALLERGY & IMMUNOLOGY | Admitting: ALLERGY & IMMUNOLOGY
Payer: COMMERCIAL

## 2021-04-11 VITALS — BODY MASS INDEX: 20.3 KG/M2

## 2021-04-11 DIAGNOSIS — Z88.6: ICD-10-CM

## 2021-04-11 DIAGNOSIS — I10: ICD-10-CM

## 2021-04-11 DIAGNOSIS — Z59.0: ICD-10-CM

## 2021-04-11 DIAGNOSIS — J45.909: ICD-10-CM

## 2021-04-11 DIAGNOSIS — H54.1213: ICD-10-CM

## 2021-04-11 DIAGNOSIS — Z56.0: ICD-10-CM

## 2021-04-11 DIAGNOSIS — Z91.018: ICD-10-CM

## 2021-04-11 DIAGNOSIS — R76.11: ICD-10-CM

## 2021-04-11 DIAGNOSIS — F10.230: Primary | ICD-10-CM

## 2021-04-11 DIAGNOSIS — F17.210: ICD-10-CM

## 2021-04-11 DIAGNOSIS — Z91.011: ICD-10-CM

## 2021-04-11 DIAGNOSIS — D50.9: ICD-10-CM

## 2021-04-11 DIAGNOSIS — J40: ICD-10-CM

## 2021-04-11 PROCEDURE — C9803 HOPD COVID-19 SPEC COLLECT: HCPCS

## 2021-04-11 PROCEDURE — U0003 INFECTIOUS AGENT DETECTION BY NUCLEIC ACID (DNA OR RNA); SEVERE ACUTE RESPIRATORY SYNDROME CORONAVIRUS 2 (SARS-COV-2) (CORONAVIRUS DISEASE [COVID-19]), AMPLIFIED PROBE TECHNIQUE, MAKING USE OF HIGH THROUGHPUT TECHNOLOGIES AS DESCRIBED BY CMS-2020-01-R: HCPCS

## 2021-04-11 PROCEDURE — HZ2ZZZZ DETOXIFICATION SERVICES FOR SUBSTANCE ABUSE TREATMENT: ICD-10-PCS | Performed by: ALLERGY & IMMUNOLOGY

## 2021-04-11 PROCEDURE — U0005 INFEC AGEN DETEC AMPLI PROBE: HCPCS

## 2021-04-11 RX ADMIN — Medication SCH MG: at 22:02

## 2021-04-11 RX ADMIN — METHOCARBAMOL PRN MG: 500 TABLET ORAL at 22:02

## 2021-04-11 RX ADMIN — Medication SCH MG: at 22:03

## 2021-04-11 SDOH — ECONOMIC STABILITY - INCOME SECURITY: UNEMPLOYMENT, UNSPECIFIED: Z56.0

## 2021-04-11 SDOH — ECONOMIC STABILITY - HOUSING INSECURITY: HOMELESSNESS: Z59.0

## 2021-04-12 LAB
ALBUMIN SERPL-MCNC: 3.3 G/DL (ref 3.4–5)
ALP SERPL-CCNC: 56 U/L (ref 45–117)
ALT SERPL-CCNC: 29 U/L (ref 13–61)
ANION GAP SERPL CALC-SCNC: 4 MMOL/L (ref 8–16)
AST SERPL-CCNC: 29 U/L (ref 15–37)
BILIRUB SERPL-MCNC: 1.1 MG/DL (ref 0.2–1)
BUN SERPL-MCNC: 19.5 MG/DL (ref 7–18)
CALCIUM SERPL-MCNC: 9.3 MG/DL (ref 8.5–10.1)
CHLORIDE SERPL-SCNC: 104 MMOL/L (ref 98–107)
CO2 SERPL-SCNC: 30 MMOL/L (ref 21–32)
CREAT SERPL-MCNC: 1.2 MG/DL (ref 0.55–1.3)
DEPRECATED RDW RBC AUTO: 15.7 % (ref 11.9–15.9)
GLUCOSE SERPL-MCNC: 105 MG/DL (ref 74–106)
HCT VFR BLD CALC: 30.8 % (ref 35.4–49)
HGB BLD-MCNC: 10.2 GM/DL (ref 11.7–16.9)
MCH RBC QN AUTO: 30.4 PG (ref 25.7–33.7)
MCHC RBC AUTO-ENTMCNC: 33 G/DL (ref 32–35.9)
MCV RBC: 92.2 FL (ref 80–96)
PLATELET # BLD AUTO: 212 K/MM3 (ref 134–434)
PMV BLD: 9.6 FL (ref 7.5–11.1)
PROT SERPL-MCNC: 7.1 G/DL (ref 6.4–8.2)
RBC # BLD AUTO: 3.34 M/MM3 (ref 4–5.6)
SODIUM SERPL-SCNC: 139 MMOL/L (ref 136–145)
WBC # BLD AUTO: 5.7 K/MM3 (ref 4–10)

## 2021-04-12 RX ADMIN — POLYVINYL ALCOHOL SCH DROP: 14 SOLUTION/ DROPS OPHTHALMIC at 22:12

## 2021-04-12 RX ADMIN — TIMOLOL MALEATE SCH DROP: 5 SOLUTION OPHTHALMIC at 22:11

## 2021-04-12 RX ADMIN — AMLODIPINE BESYLATE SCH MG: 5 TABLET ORAL at 14:41

## 2021-04-12 RX ADMIN — NICOTINE SCH: 14 PATCH, EXTENDED RELEASE TRANSDERMAL at 10:40

## 2021-04-12 RX ADMIN — POLYVINYL ALCOHOL SCH: 14 SOLUTION/ DROPS OPHTHALMIC at 18:40

## 2021-04-12 RX ADMIN — Medication SCH MG: at 22:08

## 2021-04-12 RX ADMIN — Medication SCH TAB: at 10:40

## 2021-04-13 LAB — TIBC SERPL-MCNC: 277 UG/DL (ref 250–450)

## 2021-04-13 RX ADMIN — NICOTINE SCH: 14 PATCH, EXTENDED RELEASE TRANSDERMAL at 10:48

## 2021-04-13 RX ADMIN — POLYVINYL ALCOHOL SCH: 14 SOLUTION/ DROPS OPHTHALMIC at 14:32

## 2021-04-13 RX ADMIN — POLYVINYL ALCOHOL SCH DROP: 14 SOLUTION/ DROPS OPHTHALMIC at 07:13

## 2021-04-13 RX ADMIN — AMLODIPINE BESYLATE SCH MG: 5 TABLET ORAL at 10:48

## 2021-04-13 RX ADMIN — TIMOLOL MALEATE SCH: 5 SOLUTION OPHTHALMIC at 10:55

## 2021-04-13 RX ADMIN — Medication SCH TAB: at 10:55

## 2021-04-13 RX ADMIN — Medication SCH MG: at 22:15

## 2021-04-14 RX ADMIN — TIMOLOL MALEATE SCH: 5 SOLUTION OPHTHALMIC at 22:55

## 2021-04-14 RX ADMIN — POLYVINYL ALCOHOL SCH: 14 SOLUTION/ DROPS OPHTHALMIC at 22:55

## 2021-04-14 RX ADMIN — TIMOLOL MALEATE SCH: 5 SOLUTION OPHTHALMIC at 17:24

## 2021-04-14 RX ADMIN — POLYVINYL ALCOHOL SCH DROP: 14 SOLUTION/ DROPS OPHTHALMIC at 07:23

## 2021-04-14 RX ADMIN — POLYVINYL ALCOHOL SCH: 14 SOLUTION/ DROPS OPHTHALMIC at 15:50

## 2021-04-14 RX ADMIN — AMLODIPINE BESYLATE SCH: 5 TABLET ORAL at 10:28

## 2021-04-14 RX ADMIN — NICOTINE SCH: 14 PATCH, EXTENDED RELEASE TRANSDERMAL at 10:27

## 2021-04-14 RX ADMIN — Medication SCH MG: at 22:54

## 2021-04-14 RX ADMIN — Medication SCH: at 10:28

## 2021-04-14 RX ADMIN — TIMOLOL MALEATE SCH: 5 SOLUTION OPHTHALMIC at 10:28

## 2021-04-14 RX ADMIN — POLYVINYL ALCOHOL SCH: 14 SOLUTION/ DROPS OPHTHALMIC at 17:24

## 2021-04-14 RX ADMIN — METHOCARBAMOL PRN MG: 500 TABLET ORAL at 17:46

## 2021-04-15 RX ADMIN — Medication SCH TAB: at 10:16

## 2021-04-15 RX ADMIN — NICOTINE SCH: 14 PATCH, EXTENDED RELEASE TRANSDERMAL at 10:16

## 2021-04-15 RX ADMIN — POLYVINYL ALCOHOL SCH DROP: 14 SOLUTION/ DROPS OPHTHALMIC at 13:49

## 2021-04-15 RX ADMIN — TIMOLOL MALEATE SCH: 5 SOLUTION OPHTHALMIC at 22:52

## 2021-04-15 RX ADMIN — METHOCARBAMOL PRN MG: 500 TABLET ORAL at 10:18

## 2021-04-15 RX ADMIN — AMLODIPINE BESYLATE SCH MG: 5 TABLET ORAL at 10:16

## 2021-04-15 RX ADMIN — Medication SCH: at 22:52

## 2021-04-15 RX ADMIN — POLYVINYL ALCOHOL SCH: 14 SOLUTION/ DROPS OPHTHALMIC at 22:52

## 2021-04-15 RX ADMIN — TIMOLOL MALEATE SCH DROP: 5 SOLUTION OPHTHALMIC at 10:16

## 2021-04-15 RX ADMIN — POLYVINYL ALCOHOL SCH: 14 SOLUTION/ DROPS OPHTHALMIC at 06:32

## 2021-04-16 VITALS — DIASTOLIC BLOOD PRESSURE: 73 MMHG | TEMPERATURE: 96.3 F | SYSTOLIC BLOOD PRESSURE: 125 MMHG | HEART RATE: 90 BPM

## 2021-04-16 RX ADMIN — POLYVINYL ALCOHOL SCH DROP: 14 SOLUTION/ DROPS OPHTHALMIC at 06:15

## 2023-07-02 ENCOUNTER — HOSPITAL ENCOUNTER (INPATIENT)
Dept: HOSPITAL 74 - YASAS | Age: 63
LOS: 4 days | Discharge: HOME | End: 2023-07-06
Attending: SURGERY | Admitting: ALLERGY & IMMUNOLOGY
Payer: COMMERCIAL

## 2023-07-02 VITALS — BODY MASS INDEX: 21.7 KG/M2

## 2023-07-02 DIAGNOSIS — D50.9: ICD-10-CM

## 2023-07-02 DIAGNOSIS — Z99.89: ICD-10-CM

## 2023-07-02 DIAGNOSIS — N40.0: ICD-10-CM

## 2023-07-02 DIAGNOSIS — Z87.01: ICD-10-CM

## 2023-07-02 DIAGNOSIS — F14.20: ICD-10-CM

## 2023-07-02 DIAGNOSIS — H54.61: ICD-10-CM

## 2023-07-02 DIAGNOSIS — H54.40: ICD-10-CM

## 2023-07-02 DIAGNOSIS — Z59.00: ICD-10-CM

## 2023-07-02 DIAGNOSIS — F17.210: ICD-10-CM

## 2023-07-02 DIAGNOSIS — H54.7: ICD-10-CM

## 2023-07-02 DIAGNOSIS — G89.29: ICD-10-CM

## 2023-07-02 DIAGNOSIS — J45.909: ICD-10-CM

## 2023-07-02 DIAGNOSIS — I10: ICD-10-CM

## 2023-07-02 DIAGNOSIS — F10.230: Primary | ICD-10-CM

## 2023-07-02 DIAGNOSIS — K21.9: ICD-10-CM

## 2023-07-02 DIAGNOSIS — M54.50: ICD-10-CM

## 2023-07-02 PROCEDURE — HZ2ZZZZ DETOXIFICATION SERVICES FOR SUBSTANCE ABUSE TREATMENT: ICD-10-PCS | Performed by: SURGERY

## 2023-07-02 SDOH — ECONOMIC STABILITY - HOUSING INSECURITY: HOMELESSNESS UNSPECIFIED: Z59.00

## 2023-07-03 LAB
ALBUMIN SERPL-MCNC: 4 G/DL (ref 3.4–5)
ALP SERPL-CCNC: 70 U/L (ref 45–117)
ALT SERPL-CCNC: 45 U/L (ref 13–61)
ANION GAP SERPL CALC-SCNC: 11 MMOL/L (ref 8–16)
AST SERPL-CCNC: 60 U/L (ref 15–37)
BILIRUB SERPL-MCNC: 0.9 MG/DL (ref 0.2–1)
BUN SERPL-MCNC: 13.9 MG/DL (ref 7–18)
CALCIUM SERPL-MCNC: 9.4 MG/DL (ref 8.5–10.1)
CHLORIDE SERPL-SCNC: 105 MMOL/L (ref 98–107)
CO2 SERPL-SCNC: 28 MMOL/L (ref 21–32)
CREAT SERPL-MCNC: 1.1 MG/DL (ref 0.55–1.3)
DEPRECATED RDW RBC AUTO: 17.2 % (ref 11.9–15.9)
GLUCOSE SERPL-MCNC: 72 MG/DL (ref 74–106)
HCT VFR BLD CALC: 32.5 % (ref 35.4–49)
HGB BLD-MCNC: 10.5 GM/DL (ref 11.7–16.9)
MCH RBC QN AUTO: 26.9 PG (ref 25.7–33.7)
MCHC RBC AUTO-ENTMCNC: 32.4 G/DL (ref 32–35.9)
MCV RBC: 83 FL (ref 80–96)
PLATELET # BLD AUTO: 233 10^3/UL (ref 134–434)
PMV BLD: 10.3 FL (ref 7.5–11.1)
POTASSIUM SERPLBLD-SCNC: 4 MMOL/L (ref 3.5–5.1)
PROT SERPL-MCNC: 7.9 G/DL (ref 6.4–8.2)
RBC # BLD AUTO: 3.92 M/MM3 (ref 4–5.6)
SODIUM SERPL-SCNC: 144 MMOL/L (ref 136–145)
WBC # BLD AUTO: 6 K/MM3 (ref 4–10)

## 2023-07-03 RX ADMIN — Medication SCH MG: at 22:46

## 2023-07-03 RX ADMIN — Medication SCH TAB: at 10:37

## 2023-07-03 RX ADMIN — DORZOLAMIDE HYDROCHLORIDE SCH DROP: 20 SOLUTION/ DROPS OPHTHALMIC at 22:00

## 2023-07-03 RX ADMIN — BRIMONIDINE TARTRATE SCH DROP: 2 SOLUTION/ DROPS OPHTHALMIC at 22:00

## 2023-07-03 RX ADMIN — NICOTINE SCH MG: 14 PATCH, EXTENDED RELEASE TRANSDERMAL at 10:37

## 2023-07-03 RX ADMIN — Medication SCH: at 22:49

## 2023-07-03 RX ADMIN — TIMOLOL MALEATE SCH DROP: 5 SOLUTION OPHTHALMIC at 22:00

## 2023-07-04 RX ADMIN — NICOTINE SCH: 14 PATCH, EXTENDED RELEASE TRANSDERMAL at 10:30

## 2023-07-04 RX ADMIN — TIMOLOL MALEATE SCH DROP: 5 SOLUTION OPHTHALMIC at 10:30

## 2023-07-04 RX ADMIN — DORZOLAMIDE HYDROCHLORIDE SCH: 20 SOLUTION/ DROPS OPHTHALMIC at 13:32

## 2023-07-04 RX ADMIN — DORZOLAMIDE HYDROCHLORIDE SCH DROP: 20 SOLUTION/ DROPS OPHTHALMIC at 22:34

## 2023-07-04 RX ADMIN — TAMSULOSIN HYDROCHLORIDE SCH MG: 0.4 CAPSULE ORAL at 10:29

## 2023-07-04 RX ADMIN — BRIMONIDINE TARTRATE SCH DROP: 2 SOLUTION/ DROPS OPHTHALMIC at 10:30

## 2023-07-04 RX ADMIN — ATORVASTATIN CALCIUM SCH MG: 20 TABLET, FILM COATED ORAL at 22:33

## 2023-07-04 RX ADMIN — Medication SCH MG: at 22:33

## 2023-07-04 RX ADMIN — Medication SCH TAB: at 10:29

## 2023-07-04 RX ADMIN — TIMOLOL MALEATE SCH DROP: 5 SOLUTION OPHTHALMIC at 22:34

## 2023-07-04 RX ADMIN — BRIMONIDINE TARTRATE SCH DROP: 2 SOLUTION/ DROPS OPHTHALMIC at 22:34

## 2023-07-04 RX ADMIN — METHOCARBAMOL PRN MG: 500 TABLET ORAL at 10:29

## 2023-07-04 RX ADMIN — DORZOLAMIDE HYDROCHLORIDE SCH: 20 SOLUTION/ DROPS OPHTHALMIC at 05:42

## 2023-07-04 RX ADMIN — ACETAMINOPHEN PRN MG: 325 TABLET ORAL at 10:29

## 2023-07-04 RX ADMIN — AMLODIPINE BESYLATE SCH MG: 5 TABLET ORAL at 10:29

## 2023-07-05 RX ADMIN — METHOCARBAMOL PRN MG: 500 TABLET ORAL at 22:45

## 2023-07-05 RX ADMIN — SALINE NASAL SPRAY PRN SPRAY: 1.5 SOLUTION NASAL at 22:47

## 2023-07-05 RX ADMIN — Medication SCH MG: at 22:44

## 2023-07-05 RX ADMIN — TIMOLOL MALEATE SCH DROP: 5 SOLUTION OPHTHALMIC at 10:26

## 2023-07-05 RX ADMIN — BRIMONIDINE TARTRATE SCH DROP: 2 SOLUTION/ DROPS OPHTHALMIC at 22:47

## 2023-07-05 RX ADMIN — Medication SCH TAB: at 10:25

## 2023-07-05 RX ADMIN — AMLODIPINE BESYLATE SCH MG: 5 TABLET ORAL at 10:25

## 2023-07-05 RX ADMIN — DORZOLAMIDE HYDROCHLORIDE SCH DROP: 20 SOLUTION/ DROPS OPHTHALMIC at 13:06

## 2023-07-05 RX ADMIN — TIMOLOL MALEATE SCH DROP: 5 SOLUTION OPHTHALMIC at 22:47

## 2023-07-05 RX ADMIN — TAMSULOSIN HYDROCHLORIDE SCH MG: 0.4 CAPSULE ORAL at 07:56

## 2023-07-05 RX ADMIN — BRIMONIDINE TARTRATE SCH DROP: 2 SOLUTION/ DROPS OPHTHALMIC at 10:25

## 2023-07-05 RX ADMIN — DORZOLAMIDE HYDROCHLORIDE SCH DROP: 20 SOLUTION/ DROPS OPHTHALMIC at 22:46

## 2023-07-05 RX ADMIN — DORZOLAMIDE HYDROCHLORIDE SCH DROP: 20 SOLUTION/ DROPS OPHTHALMIC at 05:58

## 2023-07-05 RX ADMIN — ATORVASTATIN CALCIUM SCH MG: 20 TABLET, FILM COATED ORAL at 22:44

## 2023-07-05 RX ADMIN — ACETAMINOPHEN PRN MG: 325 TABLET ORAL at 06:10

## 2023-07-05 RX ADMIN — METHOCARBAMOL PRN MG: 500 TABLET ORAL at 10:25

## 2023-07-05 RX ADMIN — NICOTINE SCH: 14 PATCH, EXTENDED RELEASE TRANSDERMAL at 10:25

## 2023-07-06 VITALS — TEMPERATURE: 97.3 F

## 2023-07-06 VITALS — RESPIRATION RATE: 17 BRPM | SYSTOLIC BLOOD PRESSURE: 121 MMHG | HEART RATE: 66 BPM | DIASTOLIC BLOOD PRESSURE: 70 MMHG

## 2023-07-06 RX ADMIN — DORZOLAMIDE HYDROCHLORIDE SCH DROP: 20 SOLUTION/ DROPS OPHTHALMIC at 05:37

## 2023-07-06 RX ADMIN — NICOTINE SCH: 14 PATCH, EXTENDED RELEASE TRANSDERMAL at 09:12

## 2023-07-06 RX ADMIN — SALINE NASAL SPRAY PRN SPRAY: 1.5 SOLUTION NASAL at 09:12

## 2023-07-06 RX ADMIN — TIMOLOL MALEATE SCH DROP: 5 SOLUTION OPHTHALMIC at 09:11

## 2023-07-06 RX ADMIN — AMLODIPINE BESYLATE SCH MG: 5 TABLET ORAL at 09:13

## 2023-07-06 RX ADMIN — TAMSULOSIN HYDROCHLORIDE SCH MG: 0.4 CAPSULE ORAL at 09:10

## 2023-07-06 RX ADMIN — BRIMONIDINE TARTRATE SCH DROP: 2 SOLUTION/ DROPS OPHTHALMIC at 09:11

## 2023-07-06 RX ADMIN — Medication SCH TAB: at 09:14
